# Patient Record
Sex: FEMALE
[De-identification: names, ages, dates, MRNs, and addresses within clinical notes are randomized per-mention and may not be internally consistent; named-entity substitution may affect disease eponyms.]

---

## 2020-06-18 ENCOUNTER — APPOINTMENT (OUTPATIENT)
Dept: EMERGENCY DEPT | Facility: TELEHEALTH | Age: 50
End: 2020-06-18
Payer: MEDICARE

## 2020-06-18 PROBLEM — Z00.00 ENCOUNTER FOR PREVENTIVE HEALTH EXAMINATION: Status: ACTIVE | Noted: 2020-06-18

## 2020-06-18 PROCEDURE — 99202 OFFICE O/P NEW SF 15 MIN: CPT | Mod: 95

## 2020-06-18 NOTE — REVIEW OF SYSTEMS
[Dizziness] : dizziness [Fever] : no fever [Pain] : no pain [Sore Throat] : no sore throat [Chest Pain] : no chest pain [Palpitations] : no palpitations [Shortness Of Breath] : no shortness of breath [Dyspnea on Exertion] : no dyspnea on exertion [Abdominal Pain] : no abdominal pain [Dysuria] : no dysuria [Back Pain] : no back pain [Headache] : no headache [Fainting] : no fainting

## 2020-06-18 NOTE — HISTORY OF PRESENT ILLNESS
[Home] : at home, [unfilled] , at the time of the visit. [Medical Office: ([facility])___] : at the medical office located in  [Family Member] : family member [Verbal consent obtained from patient] : the patient, [unfilled] [FreeTextEntry8] : 50F hx heart disease, CVA w/mild L weakness now p/w single, brief episode, shortly after using the bathroom, of diaphoresis, weakness and dizziness w/o syncope or pain of any kind.  Sx gradually spont improved over 5mins and pt has felt asx and at her baseline ever since.  Per mult family members currently with the patient, pt is currently at baseline, not confused.